# Patient Record
Sex: FEMALE | Race: WHITE | NOT HISPANIC OR LATINO | URBAN - METROPOLITAN AREA
[De-identification: names, ages, dates, MRNs, and addresses within clinical notes are randomized per-mention and may not be internally consistent; named-entity substitution may affect disease eponyms.]

---
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
## 2022-10-10 NOTE — ED ADULT TRIAGE NOTE - CAS ELECT INFOMATION PROVIDED
[de-identified] :   Right hip: skin is intact, no sign of infection.There is pain well localized to directly over the greater trochanter and just posterior. Tenderness radiating down the IT band. Positive Quinn test. Pain with direct palpation of the gluteal tendons and trochanter insertion.\par  Pt evaluated by MILLY Fowler/DARRELL instructions